# Patient Record
Sex: MALE | Race: BLACK OR AFRICAN AMERICAN | NOT HISPANIC OR LATINO | Employment: OTHER | URBAN - METROPOLITAN AREA
[De-identification: names, ages, dates, MRNs, and addresses within clinical notes are randomized per-mention and may not be internally consistent; named-entity substitution may affect disease eponyms.]

---

## 2020-09-05 ENCOUNTER — HOSPITAL ENCOUNTER (OUTPATIENT)
Facility: HOSPITAL | Age: 75
Discharge: HOME | End: 2020-09-06
Attending: EMERGENCY MEDICINE | Admitting: HOSPITALIST
Payer: MEDICARE

## 2020-09-05 DIAGNOSIS — R07.9 CHEST PAIN: ICD-10-CM

## 2020-09-05 DIAGNOSIS — T83.098A MALFUNCTION OF NEPHROSTOMY TUBE: Primary | ICD-10-CM

## 2020-09-05 DIAGNOSIS — C61 PROSTATE CA: ICD-10-CM

## 2020-09-05 LAB — SARS-COV-2 RDRP RESP QL NAA+PROBE: NEGATIVE

## 2020-09-05 PROCEDURE — U0002 COVID-19 LAB TEST NON-CDC: HCPCS

## 2020-09-05 PROCEDURE — G0378 HOSPITAL OBSERVATION PER HR: HCPCS

## 2020-09-05 PROCEDURE — 99285 EMERGENCY DEPT VISIT HI MDM: CPT

## 2020-09-05 RX ORDER — TAMSULOSIN HYDROCHLORIDE 0.4 MG/1
0.4 CAPSULE ORAL DAILY
Status: DISCONTINUED | OUTPATIENT
Start: 2020-09-06 | End: 2020-09-06 | Stop reason: HOSPADM

## 2020-09-05 RX ORDER — ONDANSETRON 2 MG/ML
4 INJECTION INTRAMUSCULAR; INTRAVENOUS EVERY 8 HOURS PRN
Status: DISCONTINUED | OUTPATIENT
Start: 2020-09-05 | End: 2020-09-06 | Stop reason: HOSPADM

## 2020-09-05 RX ORDER — AMLODIPINE BESYLATE 5 MG/1
5 TABLET ORAL DAILY
Status: DISCONTINUED | OUTPATIENT
Start: 2020-09-06 | End: 2020-09-06 | Stop reason: HOSPADM

## 2020-09-05 RX ORDER — ATORVASTATIN CALCIUM 40 MG/1
40 TABLET, FILM COATED ORAL DAILY
Status: DISCONTINUED | OUTPATIENT
Start: 2020-09-06 | End: 2020-09-06 | Stop reason: HOSPADM

## 2020-09-05 RX ORDER — TAMSULOSIN HYDROCHLORIDE 0.4 MG/1
0.4 CAPSULE ORAL DAILY
COMMUNITY

## 2020-09-05 RX ORDER — SODIUM CHLORIDE 0.9 % (FLUSH) 0.9 %
10 SYRINGE (ML) INJECTION
Status: DISCONTINUED | OUTPATIENT
Start: 2020-09-05 | End: 2020-09-06 | Stop reason: HOSPADM

## 2020-09-05 RX ORDER — PROMETHAZINE HYDROCHLORIDE 12.5 MG/1
12.5 TABLET ORAL EVERY 6 HOURS PRN
COMMUNITY

## 2020-09-05 RX ORDER — AMLODIPINE BESYLATE 5 MG/1
5 TABLET ORAL
COMMUNITY

## 2020-09-05 RX ORDER — NAPROXEN SODIUM 220 MG/1
81 TABLET, FILM COATED ORAL DAILY
COMMUNITY

## 2020-09-05 RX ORDER — ATORVASTATIN CALCIUM 40 MG/1
40 TABLET, FILM COATED ORAL
COMMUNITY

## 2020-09-05 RX ORDER — ACETAMINOPHEN 325 MG/1
650 TABLET ORAL EVERY 4 HOURS PRN
Status: DISCONTINUED | OUTPATIENT
Start: 2020-09-05 | End: 2020-09-06 | Stop reason: HOSPADM

## 2020-09-05 NOTE — PLAN OF CARE
(Physician in Lead of Transfers)   Outside Transfer Acceptance Note / [facility] Referral Vanduser    Name: Elliot Clark    Transferring Physician: Dr. Oj MD///Hospital Medicine    Accepting Physician: JASMEET Harman MD    Date of Acceptance:  September 5, 2020    Transferring Facility:  [facility]    Destination Facility and Admitting Physician: [facility] Medicine//.Med-Surg//Mo Quinonez MD    Reason for Transfer:  Has a nephrostomy tube complication (interventional radiology not available at the current facility)    Report from Transferring Physician/Hospital course:  75-year-old male with urinary obstruction due to mass.  Case discussed with the attending at the outside hospital.  Pathology on the mass that is obstructing his urinary system is still pending.  They are uncertain if it is prostate or another malignancy with metastasis.  He has bilateral nephrostomy tubes in place.  The right nephrostomy tube came out yesterday.  The left nephrostomy tube is still functioning without difficulty.  The patient presented to the emergency department and was admitted, but they do not have Interventional Radiology available.  He is afebrile and in no distress.  He is being transferred for IR evaluation for replacement of a right nephrostomy tube.    Case discussed with Hospital Medicine (Dr. Quinonez) at [place] and with Interventional Radiology (Dr. Rodriguez).     VS:  Temperature 97.7°, pulse 80, respirations 17, blood pressure 137/81, oxygen saturation 96% on room air    Labs:  Sodium 144, potassium 4.5, chloride 113, CO2 27.5, BUN 26, creatinine 1.8, glucose 93, AST 16, ALT 33, white blood cell count 9.2, hemoglobin 9.3, hematocrit 29, platelet count 333      To Do List: Admit to   He will need to stop in the emergency department for COVID testing  Consult interventional radiology for nephrostomy tube placement evaluation      Patient will need to stop in the  emergency department for rapid COVID testing on arrival to [facility].  Upon patient arrival to the emergency department, please contact Hospital Medicine on call.         JASMEET Harman MD  Hospital Medicine Staff  Cell: [phone]

## 2020-09-06 VITALS
WEIGHT: 142.88 LBS | HEART RATE: 87 BPM | HEIGHT: 69 IN | TEMPERATURE: 98 F | SYSTOLIC BLOOD PRESSURE: 134 MMHG | DIASTOLIC BLOOD PRESSURE: 76 MMHG | RESPIRATION RATE: 20 BRPM | BODY MASS INDEX: 21.16 KG/M2 | OXYGEN SATURATION: 100 %

## 2020-09-06 PROBLEM — T83.098A MALFUNCTION OF NEPHROSTOMY TUBE: Status: ACTIVE | Noted: 2020-09-06

## 2020-09-06 PROBLEM — N18.30 STAGE 3 CHRONIC KIDNEY DISEASE: Status: ACTIVE | Noted: 2020-09-06

## 2020-09-06 PROBLEM — T83.022A NEPHROSTOMY TUBE DISPLACED: Status: ACTIVE | Noted: 2020-09-06

## 2020-09-06 PROBLEM — I25.10 CAD (CORONARY ARTERY DISEASE): Status: ACTIVE | Noted: 2020-09-06

## 2020-09-06 PROBLEM — I10 ESSENTIAL HYPERTENSION: Status: ACTIVE | Noted: 2020-09-06

## 2020-09-06 PROBLEM — N13.9 URINARY OBSTRUCTION: Status: ACTIVE | Noted: 2020-09-05

## 2020-09-06 PROBLEM — N18.9 CKD (CHRONIC KIDNEY DISEASE): Status: ACTIVE | Noted: 2020-09-06

## 2020-09-06 PROBLEM — E78.5 HYPERLIPIDEMIA: Status: ACTIVE | Noted: 2020-09-06

## 2020-09-06 LAB
ANION GAP SERPL CALC-SCNC: 8 MMOL/L (ref 8–16)
BASOPHILS # BLD AUTO: 0.09 K/UL (ref 0–0.2)
BASOPHILS NFR BLD: 0.9 % (ref 0–1.9)
BUN SERPL-MCNC: 29 MG/DL (ref 8–23)
CALCIUM SERPL-MCNC: 9.1 MG/DL (ref 8.7–10.5)
CHLORIDE SERPL-SCNC: 106 MMOL/L (ref 95–110)
CO2 SERPL-SCNC: 26 MMOL/L (ref 23–29)
CREAT SERPL-MCNC: 1.8 MG/DL (ref 0.5–1.4)
DIFFERENTIAL METHOD: ABNORMAL
EOSINOPHIL # BLD AUTO: 0.3 K/UL (ref 0–0.5)
EOSINOPHIL NFR BLD: 2.8 % (ref 0–8)
ERYTHROCYTE [DISTWIDTH] IN BLOOD BY AUTOMATED COUNT: 14.9 % (ref 11.5–14.5)
EST. GFR  (AFRICAN AMERICAN): 42 ML/MIN/1.73 M^2
EST. GFR  (NON AFRICAN AMERICAN): 36 ML/MIN/1.73 M^2
GLUCOSE SERPL-MCNC: 91 MG/DL (ref 70–110)
HCT VFR BLD AUTO: 30.9 % (ref 40–54)
HGB BLD-MCNC: 9.5 G/DL (ref 14–18)
IMM GRANULOCYTES # BLD AUTO: 0.21 K/UL (ref 0–0.04)
IMM GRANULOCYTES NFR BLD AUTO: 2 % (ref 0–0.5)
INR PPP: 1.1 (ref 0.8–1.2)
LYMPHOCYTES # BLD AUTO: 1.7 K/UL (ref 1–4.8)
LYMPHOCYTES NFR BLD: 16.2 % (ref 18–48)
MCH RBC QN AUTO: 27.9 PG (ref 27–31)
MCHC RBC AUTO-ENTMCNC: 30.7 G/DL (ref 32–36)
MCV RBC AUTO: 91 FL (ref 82–98)
MONOCYTES # BLD AUTO: 1.2 K/UL (ref 0.3–1)
MONOCYTES NFR BLD: 11.4 % (ref 4–15)
NEUTROPHILS # BLD AUTO: 7.1 K/UL (ref 1.8–7.7)
NEUTROPHILS NFR BLD: 66.7 % (ref 38–73)
NRBC BLD-RTO: 0 /100 WBC
PLATELET # BLD AUTO: 319 K/UL (ref 150–350)
PMV BLD AUTO: 10.3 FL (ref 9.2–12.9)
POTASSIUM SERPL-SCNC: 4.6 MMOL/L (ref 3.5–5.1)
PROTHROMBIN TIME: 11.7 SEC (ref 9–12.5)
RBC # BLD AUTO: 3.4 M/UL (ref 4.6–6.2)
SODIUM SERPL-SCNC: 140 MMOL/L (ref 136–145)
WBC # BLD AUTO: 10.56 K/UL (ref 3.9–12.7)

## 2020-09-06 PROCEDURE — 63600175 PHARM REV CODE 636 W HCPCS: Performed by: RADIOLOGY

## 2020-09-06 PROCEDURE — 63600175 PHARM REV CODE 636 W HCPCS: Performed by: HOSPITALIST

## 2020-09-06 PROCEDURE — 94761 N-INVAS EAR/PLS OXIMETRY MLT: CPT | Mod: 59

## 2020-09-06 PROCEDURE — 85025 COMPLETE CBC W/AUTO DIFF WBC: CPT

## 2020-09-06 PROCEDURE — 85610 PROTHROMBIN TIME: CPT

## 2020-09-06 PROCEDURE — G0378 HOSPITAL OBSERVATION PER HR: HCPCS | Mod: CS

## 2020-09-06 PROCEDURE — 25000003 PHARM REV CODE 250: Performed by: NURSE PRACTITIONER

## 2020-09-06 PROCEDURE — 80048 BASIC METABOLIC PNL TOTAL CA: CPT

## 2020-09-06 RX ORDER — FENTANYL CITRATE 50 UG/ML
INJECTION, SOLUTION INTRAMUSCULAR; INTRAVENOUS CODE/TRAUMA/SEDATION MEDICATION
Status: COMPLETED | OUTPATIENT
Start: 2020-09-06 | End: 2020-09-06

## 2020-09-06 RX ORDER — HEPARIN 100 UNIT/ML
5 SYRINGE INTRAVENOUS
Status: DISCONTINUED | OUTPATIENT
Start: 2020-09-06 | End: 2020-09-06 | Stop reason: HOSPADM

## 2020-09-06 RX ORDER — IPRATROPIUM BROMIDE AND ALBUTEROL SULFATE 2.5; .5 MG/3ML; MG/3ML
3 SOLUTION RESPIRATORY (INHALATION) EVERY 4 HOURS PRN
Status: DISCONTINUED | OUTPATIENT
Start: 2020-09-06 | End: 2020-09-06 | Stop reason: HOSPADM

## 2020-09-06 RX ORDER — MIDAZOLAM HYDROCHLORIDE 1 MG/ML
INJECTION INTRAMUSCULAR; INTRAVENOUS CODE/TRAUMA/SEDATION MEDICATION
Status: COMPLETED | OUTPATIENT
Start: 2020-09-06 | End: 2020-09-06

## 2020-09-06 RX ADMIN — FENTANYL CITRATE 50 MCG: 50 INJECTION INTRAMUSCULAR; INTRAVENOUS at 10:09

## 2020-09-06 RX ADMIN — MIDAZOLAM HYDROCHLORIDE 1 MG: 1 INJECTION, SOLUTION INTRAMUSCULAR; INTRAVENOUS at 10:09

## 2020-09-06 RX ADMIN — CEFTRIAXONE 2 G: 2 INJECTION, SOLUTION INTRAVENOUS at 10:09

## 2020-09-06 RX ADMIN — TAMSULOSIN HYDROCHLORIDE 0.4 MG: 0.4 CAPSULE ORAL at 08:09

## 2020-09-06 RX ADMIN — HEPARIN SODIUM (PORCINE) LOCK FLUSH IV SOLN 100 UNIT/ML 500 UNITS: 100 SOLUTION at 01:09

## 2020-09-06 RX ADMIN — AMLODIPINE BESYLATE 5 MG: 5 TABLET ORAL at 08:09

## 2020-09-06 RX ADMIN — ATORVASTATIN CALCIUM 40 MG: 40 TABLET, FILM COATED ORAL at 08:09

## 2020-09-06 NOTE — CONSULTS
Interventional Radiology    New right nephrostomy tube placed using existing track. Tube in good position and draining well. No immediate post-procedure complications. Catheter secured in place.    Misael Rodriguez MD  [phone]

## 2020-09-06 NOTE — PLAN OF CARE
Problem: Adult Inpatient Plan of Care  Goal: Plan of Care Review  Outcome: Ongoing, Progressing  Mr. Clark is resting. No complaints of PAIN,NV,SOB. NPO since MN, pending IR placement of drain. Safety maintained.

## 2020-09-06 NOTE — PLAN OF CARE
Discharge orders noted, no HH or HME ordered.    Pt's nurse will go over medications/signs and symptoms prior to discharge       09/06/20 1113   Final Note   Assessment Type Final Discharge Note   Anticipated Discharge Disposition Home   What phone number can be called within the next 1-3 days to see how you are doing after discharge? [phone]   Hospital Follow Up  Appt(s) scheduled? No   Right Care Referral Info   Post Acute Recommendation No Care     Nedra Guthrie RN Transitional Navigator  [phone]

## 2020-09-06 NOTE — ASSESSMENT & PLAN NOTE
IR consulted for replacement   -left nephrostomy tube in place; s/p right nephrostomy tube replacement via IR on 9/6

## 2020-09-06 NOTE — H&P
[facility] Hospital Medicine  History & Physical    Patient Name: Elliot Clark  MRN: [id number]  Admission Date: 9/5/2020  Attending Physician: Mo Quinonez, *   Primary Care Provider: Primary Doctor No         Patient information was obtained from patient, past medical records and ER records.     Subjective:     Principal Problem:Nephrostomy tube displaced    Chief Complaint:   Chief Complaint   Patient presents with    [facility]     Transfer for covid swab. Being admitted to IR for right nephostomy tube replacement by Dr. Quinonez        HPI: Elliot Clark is a 75-year-old male with HTN, HLD, CAD s/p CABG, CKD and urinary obstruction due to mass presented to OSH for displaced right nephrostomy tube.  Pathology on the mass that is obstructing his urinary system is still pending.  They are uncertain if it is prostate or another malignancy with metastasis.  He has bilateral nephrostomy tubes in place.  The right nephrostomy tube came out yesterday.  The left nephrostomy tube is still functioning without difficulty.  The patient presented to the emergency department and was admitted, but they do not have Interventional Radiology available.  He is afebrile and in no distress.  He is being transferred for IR evaluation for replacement of a right nephrostomy tube. No complaints on exam. VSS.        Past Medical History:   Diagnosis Date    Arthritis     Asthma     Cancer     GERD (gastroesophageal reflux disease)     Hypercholesterolemia     Hypertension     PVD (peripheral vascular disease)     Renal disorder     Syncope        No past surgical history on file.    Review of patient's allergies indicates:  No Known Allergies    No current facility-administered medications on file prior to encounter.      Current Outpatient Medications on File Prior to Encounter   Medication Sig    amLODIPine (NORVASC) 5 MG tablet Take 5 mg by mouth.    aspirin 81 MG Chew Take 81 mg by  mouth once daily.    atorvastatin (LIPITOR) 40 MG tablet Take 40 mg by mouth.    promethazine (PHENERGAN) 12.5 MG Tab Take 12.5 mg by mouth every 6 (six) hours as needed.    tamsulosin (FLOMAX) 0.4 mg Cap Take 0.4 mg by mouth once daily.     Family History     None        Tobacco Use    Smoking status: Not on file   Substance and Sexual Activity    Alcohol use: Not on file    Drug use: Not on file    Sexual activity: Not on file     Review of Systems   Constitutional: Negative for chills and fever.   Eyes: Negative for photophobia.   Respiratory: Negative for cough, chest tightness, shortness of breath and wheezing.    Cardiovascular: Negative for chest pain, palpitations and leg swelling.   Gastrointestinal: Negative for abdominal pain, diarrhea, nausea and vomiting.   Genitourinary: Negative for dysuria, flank pain and hematuria.   Musculoskeletal: Negative for back pain, myalgias and neck pain.   Skin: Negative for rash and wound.   Neurological: Negative for dizziness, syncope and headaches.   Psychiatric/Behavioral: Negative for agitation.     Objective:     Vital Signs (Most Recent):  Temp: 98 °F (36.7 °C) (09/06/20 0455)  Pulse: 80 (09/06/20 0455)  Resp: 16 (09/06/20 0455)  BP: 126/73 (09/06/20 0455)  SpO2: 98 % (09/05/20 2212) Vital Signs (24h Range):  Temp:  [97.6 °F (36.4 °C)-98.8 °F (37.1 °C)] 98 °F (36.7 °C)  Pulse:  [66-92] 80  Resp:  [16-18] 16  SpO2:  [96 %-100 %] 98 %  BP: (124-165)/(68-90) 126/73     Weight: 64.8 kg (142 lb 13.7 oz)  Body mass index is 21.1 kg/m².    Physical Exam  Constitutional:       General: He is not in acute distress.     Appearance: He is well-developed.      Comments: frail   HENT:      Head: Normocephalic and atraumatic.   Eyes:      Conjunctiva/sclera: Conjunctivae normal.      Pupils: Pupils are equal, round, and reactive to light.   Neck:      Musculoskeletal: Normal range of motion and neck supple.      Vascular: No JVD.   Cardiovascular:      Rate and Rhythm:  Normal rate and regular rhythm.      Heart sounds: Normal heart sounds.   Pulmonary:      Effort: Pulmonary effort is normal. No respiratory distress.      Breath sounds: Normal breath sounds. No wheezing.   Abdominal:      General: Bowel sounds are normal. There is no distension.      Palpations: Abdomen is soft.      Tenderness: There is no abdominal tenderness. There is no guarding.   Genitourinary:     Comments: Dislodged right nephrostomy tube   Musculoskeletal: Normal range of motion.         General: No tenderness.   Skin:     General: Skin is warm and dry.      Capillary Refill: Capillary refill takes less than 2 seconds.      Findings: No erythema.   Neurological:      Mental Status: He is alert and oriented to person, place, and time.   Psychiatric:         Behavior: Behavior normal.           CRANIAL NERVES     CN III, IV, VI   Pupils are equal, round, and reactive to light.       Significant Labs:   CBC:   Recent Labs   Lab 09/06/20  0638   WBC 10.56   HGB 9.5*   HCT 30.9*          Significant Imaging: I have reviewed all pertinent imaging results/findings within the past 24 hours.    Assessment/Plan:     * Nephrostomy tube displaced  IR consulted for replacement   -left nephrostomy tube in place       CKD (chronic kidney disease)  Renal function stable   Renal dose meds, avoid nephrotoxic meds  Avoid hypotension       CAD (coronary artery disease)  Continue asa, statin       Hyperlipidemia  Taking ASA, statin       Essential hypertension  Continue amlodipine 5 mg daily       Urinary obstruction  -possible prostate CA vs metastatic disease- pathology pending        VTE Risk Mitigation (From admission, onward)         Ordered     IP VTE HIGH RISK PATIENT  Once      09/05/20 2030     Place sequential compression device  Until discontinued      09/05/20 2030                   Susanne Lee NP  Department of Hospital Medicine   [facility]

## 2020-09-06 NOTE — HPI
Elliot Clark is a 75-year-old male with HTN, HLD, CAD s/p CABG, CKD and urinary obstruction due to mass presented to OSH for displaced right nephrostomy tube.  Pathology on the mass that is obstructing his urinary system is still pending.  They are uncertain if it is prostate or another malignancy with metastasis.  He has bilateral nephrostomy tubes in place.  The right nephrostomy tube came out yesterday.  The left nephrostomy tube is still functioning without difficulty.  The patient presented to the emergency department and was admitted, but they do not have Interventional Radiology available.  He is afebrile and in no distress.  He is being transferred for IR evaluation for replacement of a right nephrostomy tube. No complaints on exam. VSS.

## 2020-09-06 NOTE — ED PROVIDER NOTES
Encounter Date: 9/5/2020       History     Chief Complaint   Patient presents with    [facility] Transfer     Transfer for covid swab. Being admitted to IR for right nephostomy tube replacement by Dr. Quinonez     Pt presents to the ED for COVID 19 swab. Pt is a transfer from [facility]. He is being admitted to [facility] medicine service/IR for R nephrostomy tube placement.         Review of patient's allergies indicates:  No Known Allergies  No past medical history on file.  No past surgical history on file.  No family history on file.  Social History     Tobacco Use    Smoking status: Not on file   Substance Use Topics    Alcohol use: Not on file    Drug use: Not on file     Review of Systems   Constitutional: Negative for chills and fever.   Respiratory: Negative for shortness of breath.    Cardiovascular: Negative for chest pain, palpitations and leg swelling.   Gastrointestinal: Negative for anal bleeding, nausea and vomiting.   Musculoskeletal: Negative for back pain, myalgias and neck pain.   Skin: Negative for pallor and rash.   Psychiatric/Behavioral: Negative for agitation and confusion.       Physical Exam     Initial Vitals [09/05/20 2000]   BP Pulse Resp Temp SpO2   (!) 165/90 84 -- 98.7 °F (37.1 °C) 100 %      MAP       --         Physical Exam    Constitutional: He appears well-developed and well-nourished.   HENT:   Head: Normocephalic and atraumatic.   Eyes: Conjunctivae and EOM are normal. Pupils are equal, round, and reactive to light.   Neck: Normal range of motion. Neck supple.   Cardiovascular: Normal rate, regular rhythm and normal heart sounds.   Pulmonary/Chest: Breath sounds normal.   Abdominal: Soft. Bowel sounds are normal.   R nephrostomy tube in place  L nephrostomy tube absent    Musculoskeletal: Normal range of motion.   Neurological: He is alert and oriented to person, place, and time.   Skin: Skin is warm and dry.   Psychiatric: He has a normal mood and affect.          ED Course   Procedures  Labs Reviewed   SARS-COV-2 RNA AMPLIFICATION, QUAL          Imaging Results    None          Medical Decision Making:   ED Management:  - COVID 19 swab negative  - pt to be admitted to [facility] medicine service for further management                                  Clinical Impression:       ICD-10-CM ICD-9-CM   1. Malfunction of nephrostomy tube  T83.098A 997.5   2. Prostate CA  C61 185   3. Chest pain  R07.9 786.50             ED Disposition Condition    Observation                           Morteza Dai MD  09/05/20 2034

## 2020-09-06 NOTE — PLAN OF CARE
Discharge orders noted. Additional clinical references attached.    Patient's discharge instructions given by bedside RN and reviewed via this VN.  Education provided on new medication, diagnosis, and follow-up appointments. Patient verbalized understanding. All questions answered.  Floor nurse notified.  Nurse to set up transport when patient ready to leave floor.

## 2020-09-06 NOTE — PLAN OF CARE
VN cued into room to complete admit assessment, VIP model introduced, VN working alongside bedside treatment team.  Plan of care reviewed with patient. Patient verbalized understanding. Patient informed of fall risk and fall precautions, call light within reach, 2x bed rails. Patient notified to ask staff for assistance and pt verbalized complete understanding. Time allowed for questions.Patient does report having a PAC for chemotherapy to right chestwall. Will continue to monitor and intervene as needed.

## 2020-09-06 NOTE — SUBJECTIVE & OBJECTIVE
Past Medical History:   Diagnosis Date    Arthritis     Asthma     Cancer     GERD (gastroesophageal reflux disease)     Hypercholesterolemia     Hypertension     PVD (peripheral vascular disease)     Renal disorder     Syncope        No past surgical history on file.    Review of patient's allergies indicates:  No Known Allergies    No current facility-administered medications on file prior to encounter.      Current Outpatient Medications on File Prior to Encounter   Medication Sig    amLODIPine (NORVASC) 5 MG tablet Take 5 mg by mouth.    aspirin 81 MG Chew Take 81 mg by mouth once daily.    atorvastatin (LIPITOR) 40 MG tablet Take 40 mg by mouth.    promethazine (PHENERGAN) 12.5 MG Tab Take 12.5 mg by mouth every 6 (six) hours as needed.    tamsulosin (FLOMAX) 0.4 mg Cap Take 0.4 mg by mouth once daily.     Family History     None        Tobacco Use    Smoking status: Not on file   Substance and Sexual Activity    Alcohol use: Not on file    Drug use: Not on file    Sexual activity: Not on file     Review of Systems   Constitutional: Negative for chills and fever.   Eyes: Negative for photophobia.   Respiratory: Negative for cough, chest tightness, shortness of breath and wheezing.    Cardiovascular: Negative for chest pain, palpitations and leg swelling.   Gastrointestinal: Negative for abdominal pain, diarrhea, nausea and vomiting.   Genitourinary: Negative for dysuria, flank pain and hematuria.   Musculoskeletal: Negative for back pain, myalgias and neck pain.   Skin: Negative for rash and wound.   Neurological: Negative for dizziness, syncope and headaches.   Psychiatric/Behavioral: Negative for agitation.     Objective:     Vital Signs (Most Recent):  Temp: 98 °F (36.7 °C) (09/06/20 0455)  Pulse: 80 (09/06/20 0455)  Resp: 16 (09/06/20 0455)  BP: 126/73 (09/06/20 0455)  SpO2: 98 % (09/05/20 2212) Vital Signs (24h Range):  Temp:  [97.6 °F (36.4 °C)-98.8 °F (37.1 °C)] 98 °F (36.7 °C)  Pulse:   [66-92] 80  Resp:  [16-18] 16  SpO2:  [96 %-100 %] 98 %  BP: (124-165)/(68-90) 126/73     Weight: 64.8 kg (142 lb 13.7 oz)  Body mass index is 21.1 kg/m².    Physical Exam  Constitutional:       General: He is not in acute distress.     Appearance: He is well-developed.      Comments: frail   HENT:      Head: Normocephalic and atraumatic.   Eyes:      Conjunctiva/sclera: Conjunctivae normal.      Pupils: Pupils are equal, round, and reactive to light.   Neck:      Musculoskeletal: Normal range of motion and neck supple.      Vascular: No JVD.   Cardiovascular:      Rate and Rhythm: Normal rate and regular rhythm.      Heart sounds: Normal heart sounds.   Pulmonary:      Effort: Pulmonary effort is normal. No respiratory distress.      Breath sounds: Normal breath sounds. No wheezing.   Abdominal:      General: Bowel sounds are normal. There is no distension.      Palpations: Abdomen is soft.      Tenderness: There is no abdominal tenderness. There is no guarding.   Genitourinary:     Comments: Dislodged right nephrostomy tube   Musculoskeletal: Normal range of motion.         General: No tenderness.   Skin:     General: Skin is warm and dry.      Capillary Refill: Capillary refill takes less than 2 seconds.      Findings: No erythema.   Neurological:      Mental Status: He is alert and oriented to person, place, and time.   Psychiatric:         Behavior: Behavior normal.           CRANIAL NERVES     CN III, IV, VI   Pupils are equal, round, and reactive to light.       Significant Labs:   CBC:   Recent Labs   Lab 09/06/20  0638   WBC 10.56   HGB 9.5*   HCT 30.9*          Significant Imaging: I have reviewed all pertinent imaging results/findings within the past 24 hours.

## 2020-09-06 NOTE — HOSPITAL COURSE
"Mr. Clark presented as transfer for IR guided replacement of right sided nephrostomy tube. Procedure completed without incident on 9/6/20. Discharge home.    /73 (BP Location: Right arm, Patient Position: Lying)   Pulse 80   Temp 98 °F (36.7 °C) (Oral)   Resp 16   Ht 5' 9" (1.753 m)   Wt 64.8 kg (142 lb 13.7 oz)   SpO2 98%   BMI 21.10 kg/m²   Physical Exam  Constitutional:       General: He is not in acute distress.     Appearance: He is well-developed.      Comments: frail   HENT:      Head: Normocephalic and atraumatic.   Eyes:      Conjunctiva/sclera: Conjunctivae normal.      Pupils: Pupils are equal, round, and reactive to light.   Neck:      Musculoskeletal: Normal range of motion and neck supple.      Vascular: No JVD.   Cardiovascular:      Rate and Rhythm: Normal rate and regular rhythm.      Heart sounds: Normal heart sounds.   Pulmonary:      Effort: Pulmonary effort is normal. No respiratory distress.      Breath sounds: Normal breath sounds. No wheezing.   Abdominal:      General: Bowel sounds are normal. There is no distension.      Palpations: Abdomen is soft.      Tenderness: There is no abdominal tenderness. There is no guarding.   Genitourinary:     Comments: Bilateral nephrostomy tubes in place  Musculoskeletal: Normal range of motion.         General: No tenderness.   Skin:     General: Skin is warm and dry.      Capillary Refill: Capillary refill takes less than 2 seconds.      Findings: No erythema.   Neurological:      Mental Status: He is alert and oriented to person, place, and time.   Psychiatric:         Behavior: Behavior normal.   "

## 2020-09-06 NOTE — DISCHARGE SUMMARY
"[facility] Hospital Medicine  Discharge Summary      Patient Name: Elliot Clark  MRN: [id number]  Admission Date: 9/5/2020  Hospital Length of Stay: 0 days  Discharge Date and Time:  09/06/2020 11:53 AM  Attending Physician: Mo Quinonez, *   Discharging Provider: Mo Quinonez MD  Primary Care Provider: Primary Doctor No      HPI:   Elliot Clark is a 75-year-old male with HTN, HLD, CAD s/p CABG, CKD and urinary obstruction due to mass presented to OSH for displaced right nephrostomy tube.  Pathology on the mass that is obstructing his urinary system is still pending.  They are uncertain if it is prostate or another malignancy with metastasis.  He has bilateral nephrostomy tubes in place.  The right nephrostomy tube came out yesterday.  The left nephrostomy tube is still functioning without difficulty.  The patient presented to the emergency department and was admitted, but they do not have Interventional Radiology available.  He is afebrile and in no distress.  He is being transferred for IR evaluation for replacement of a right nephrostomy tube. No complaints on exam. VSS.        Procedure(s) (LRB):  CREATION, NEPHROSTOMY, PERCUTANEOUS (N/A)      Hospital Course:   Mr. Clark presented as transfer for IR guided replacement of right sided nephrostomy tube. Procedure completed without incident on 9/6/20. Discharge home.    /73 (BP Location: Right arm, Patient Position: Lying)   Pulse 80   Temp 98 °F (36.7 °C) (Oral)   Resp 16   Ht 5' 9" (1.753 m)   Wt 64.8 kg (142 lb 13.7 oz)   SpO2 98%   BMI 21.10 kg/m²   Physical Exam  Constitutional:       General: He is not in acute distress.     Appearance: He is well-developed.      Comments: frail   HENT:      Head: Normocephalic and atraumatic.   Eyes:      Conjunctiva/sclera: Conjunctivae normal.      Pupils: Pupils are equal, round, and reactive to light.   Neck:      Musculoskeletal: Normal range of motion and neck " supple.      Vascular: No JVD.   Cardiovascular:      Rate and Rhythm: Normal rate and regular rhythm.      Heart sounds: Normal heart sounds.   Pulmonary:      Effort: Pulmonary effort is normal. No respiratory distress.      Breath sounds: Normal breath sounds. No wheezing.   Abdominal:      General: Bowel sounds are normal. There is no distension.      Palpations: Abdomen is soft.      Tenderness: There is no abdominal tenderness. There is no guarding.   Genitourinary:     Comments: Bilateral nephrostomy tubes in place  Musculoskeletal: Normal range of motion.         General: No tenderness.   Skin:     General: Skin is warm and dry.      Capillary Refill: Capillary refill takes less than 2 seconds.      Findings: No erythema.   Neurological:      Mental Status: He is alert and oriented to person, place, and time.   Psychiatric:         Behavior: Behavior normal.      Consults:   Consults (From admission, onward)        Status Ordering Provider     Inpatient consult to Interventional Radiology  Once     Provider:  (Not yet assigned)    Completed BISHOP DOLAN          * Nephrostomy tube displaced  IR consulted for replacement   -left nephrostomy tube in place; s/p right nephrostomy tube replacement via IR on 9/6      Stage 3 chronic kidney disease  Renal function stable   Renal dose meds, avoid nephrotoxic meds  Avoid hypotension       CAD (coronary artery disease)  Continue asa, statin       Hyperlipidemia  Taking ASA, statin       Essential hypertension  Continue amlodipine 5 mg daily       Urinary obstruction  -possible prostate CA vs metastatic disease- pathology pending  -s/p bilateral nephrostomy tube placement  -continue outpatient care      Final Active Diagnoses:    Diagnosis Date Noted POA    PRINCIPAL PROBLEM:  Nephrostomy tube displaced [T83.022A] 09/06/2020 Yes    Essential hypertension [I10] 09/06/2020 Yes    Hyperlipidemia [E78.5] 09/06/2020 Yes    CAD (coronary artery disease) [I25.10]  09/06/2020 Yes    Stage 3 chronic kidney disease [N18.3] 09/06/2020 Yes    Malfunction of nephrostomy tube [T83.098A] 09/06/2020 Yes    Urinary obstruction [N13.9] 09/05/2020 Yes      Problems Resolved During this Admission:       Discharged Condition: good    Disposition: Home or Self Care    Follow Up:  Follow-up Information     Schedule an appointment as soon as possible for a visit with Primary Doctor No.    Why: To establish care with a Primary Care Physician, call [phone]               Patient Instructions:      Diet Cardiac     Notify your health care provider if you experience any of the following:  temperature >100.4     Notify your health care provider if you experience any of the following:  redness, tenderness, or signs of infection (pain, swelling, redness, odor or green/yellow discharge around incision site)     Activity as tolerated       Significant Diagnostic Studies: Labs:   CMP   Recent Labs   Lab 09/06/20 0638      K 4.6      CO2 26   GLU 91   BUN 29*   CREATININE 1.8*   CALCIUM 9.1   ANIONGAP 8   ESTGFRAFRICA 42*   EGFRNONAA 36*   , CBC   Recent Labs   Lab 09/06/20 0638   WBC 10.56   HGB 9.5*   HCT 30.9*      , INR   Lab Results   Component Value Date    INR 1.1 09/06/2020   , Lipid Panel No results found for: CHOL, HDL, LDLCALC, TRIG, CHOLHDL, Troponin No results for input(s): TROPONINI in the last 168 hours., A1C: No results for input(s): HGBA1C in the last 4320 hours. and All labs within the past 24 hours have been reviewed    Pending Diagnostic Studies:     Procedure Component Value Units Date/Time    Basic Metabolic Panel (BMP) [[id number]] Collected: 09/06/20 0638    Order Status: Sent Lab Status: In process Updated: 09/06/20 0638    Specimen: Blood          Medications:  Reconciled Home Medications:      Medication List      CONTINUE taking these medications    amLODIPine 5 MG tablet  Commonly known as: NORVASC  Take 5 mg by mouth.     aspirin 81 MG Chew  Take  81 mg by mouth once daily.     atorvastatin 40 MG tablet  Commonly known as: LIPITOR  Take 40 mg by mouth.     promethazine 12.5 MG Tab  Commonly known as: PHENERGAN  Take 12.5 mg by mouth every 6 (six) hours as needed.     tamsulosin 0.4 mg Cap  Commonly known as: FLOMAX  Take 0.4 mg by mouth once daily.            Indwelling Lines/Drains at time of discharge:   Lines/Drains/Airways     Central Venous Catheter Line            Port A Cath Single Lumen 09/05/20 0500 right subclavian 1 day          Drain                 Nephrostomy 09/05/20 2056 Left less than 1 day         Nephrostomy 09/06/20 1028 Right 8 Fr. less than 1 day                Time spent on the discharge of patient: 25 minutes  Patient was seen and examined on the date of discharge and determined to be suitable for discharge.         Mo Quinonez MD  Department of Hospital Medicine  [facility]

## 2020-09-06 NOTE — PROCEDURES
[facility]  Interventional Radiology  High Risk Procedure - Inpatient    Date: 09/06/2020 Time: 10:51 AM    Pre-Op Diagnosis: Ureteral obstruction requiring nephrostomy tubes    Post-Op Diagnosis: same    Procedure Performed by: Misael Rodriguez MD    Assistant: none    Procedure: Placement of right nephrostomy tube under fluoroscopy    Specimen/Tissue Removed: No    Estimated Blood Loss: Less than 5 mL    Procedure Note/Findings: Patient with prior neph tube track. Using dilator and angled glide wire the right renal collecting system was accessed. Track was dilated and a new 8 Haitian neph tube placed and secured in place. No immediate post-procedure complications noted. Antegrade nephrostogram demonstrates tube in good position.            Please refer to dictated report for additional details.

## 2020-09-06 NOTE — H&P
[facility]  History & Physical - Short Stay  Interventional Radiology    SUBJECTIVE:     Chief Complaint/Reason for Admission: Right nephrostomy tube dislodged    Informant(s):  self and Electronic Health Record    History of Present Illness:  Elliot Clark is a 75 y.o. male with a history of ureteral obstruction requiring bilateral nephrostomy tubes.    Patient presents for right nephrostomy tube placement.    Scheduled Meds:    amLODIPine  5 mg Oral Daily    atorvastatin  40 mg Oral Daily    tamsulosin  0.4 mg Oral Daily     Continuous Infusions:   PRN Meds: acetaminophen, albuterol-ipratropium, ondansetron, sodium chloride 0.9%    Review of patient's allergies indicates:  No Known Allergies    Past Medical History:   Diagnosis Date    Arthritis     Asthma     Cancer     GERD (gastroesophageal reflux disease)     Hypercholesterolemia     Hypertension     PVD (peripheral vascular disease)     Renal disorder     Syncope      No past surgical history on file.  No family history on file.  Social History     Tobacco Use    Smoking status: Not on file   Substance Use Topics    Alcohol use: Not on file    Drug use: Not on file        Review of Systems:  ROS not obtained    OBJECTIVE:     Vital Signs (Most Recent):  Temp: 98 °F (36.7 °C) (09/06/20 0814)  Pulse: 72 (09/06/20 0814)  Resp: 20 (09/06/20 0814)  BP: (!) 155/71 (09/06/20 0814)  SpO2: 97 % (09/06/20 0900)    Physical Exam:  Lungs: No respiratory distress  Cardiac: regular rate and rhythm  Alert and oriented    Laboratory  CBC:   Lab Results   Component Value Date/Time    WBC 10.56 09/06/2020 06:38 AM    RBC 3.40 (L) 09/06/2020 06:38 AM    HGB 9.5 (L) 09/06/2020 06:38 AM    HCT 30.9 (L) 09/06/2020 06:38 AM     09/06/2020 06:38 AM    MCV 91 09/06/2020 06:38 AM    MCH 27.9 09/06/2020 06:38 AM    MCHC 30.7 (L) 09/06/2020 06:38 AM     Coagulation:   Lab Results   Component Value Date/Time    INR 1.1 09/06/2020 06:38 AM          ASSESSMENT/PLAN:     Ureteral obstruction requiring percutaneous neph tubes.    Patient will undergo right nephrostomy tube placement.    Sedation/Anesthesia Assessment:  ASA Classification: II = Mild systemic disease  Mallampati Score: II (hard and soft palate, upper portion of tonsils anduvula visible)    Sedation History: No problems    Sedation Plan: Conscious sedation

## 2020-09-06 NOTE — NURSING
Procedure complete. Patient tolerated well. No complications. 8 fr drainage catheter inserted to right flank, connected to gravity drainage bag, sutured, and covered with stabilization dressing with gauze and Tegaderm. Catheter draining clear yellow urine. Patient states he is not currently experiencing any pain. VSS Report called to LAISHA Pablo. Patient ready for transport back to room.

## 2020-09-19 ENCOUNTER — NURSE TRIAGE (OUTPATIENT)
Dept: ADMINISTRATIVE | Facility: CLINIC | Age: 75
End: 2020-09-19

## 2020-09-19 NOTE — TELEPHONE ENCOUNTER
Spoke with patient on behalf of post procedural symptom tracker. Pt denies difficulty breathing, cough or fever since procedure. Instructed to notify PCP or OOC if symptoms develop.    Reason for Disposition   Health Information question, no triage required and triager able to answer question    Protocols used: INFORMATION ONLY CALL - NO TRIAGE-A-

## 2024-08-02 NOTE — ASSESSMENT & PLAN NOTE
Continue amlodipine 5 mg daily      [facility] phone number [phone] for any questions.      OTHER INSTRUCTIONS(if applicable)____none_____________________________________________________        VISITOR POLICY(subject to change)    Current visitor policy is 2 visitors per patient.No children allowed. Mask at discretion of facility.  Visiting hours are 8a-8p. Overnight visitors will be at the discretion of the nurse. All policies are subject to change.

## 2024-09-18 NOTE — ASSESSMENT & PLAN NOTE
-possible prostate CA vs metastatic disease- pathology pending  -s/p bilateral nephrostomy tube placement  -continue outpatient care   impaired balance/impaired coordination/decreased flexibility/pain/impaired postural control/decreased ROM/decreased strength